# Patient Record
Sex: FEMALE | Race: WHITE | NOT HISPANIC OR LATINO | URBAN - METROPOLITAN AREA
[De-identification: names, ages, dates, MRNs, and addresses within clinical notes are randomized per-mention and may not be internally consistent; named-entity substitution may affect disease eponyms.]

---

## 2022-08-30 ENCOUNTER — OUTPATIENT (OUTPATIENT)
Dept: OUTPATIENT SERVICES | Facility: HOSPITAL | Age: 47
LOS: 1 days | End: 2022-08-30
Payer: COMMERCIAL

## 2022-08-30 VITALS
HEART RATE: 74 BPM | SYSTOLIC BLOOD PRESSURE: 134 MMHG | OXYGEN SATURATION: 100 % | WEIGHT: 134.92 LBS | TEMPERATURE: 97 F | DIASTOLIC BLOOD PRESSURE: 48 MMHG | RESPIRATION RATE: 16 BRPM

## 2022-08-30 DIAGNOSIS — Z98.890 OTHER SPECIFIED POSTPROCEDURAL STATES: Chronic | ICD-10-CM

## 2022-08-30 DIAGNOSIS — J98.8 OTHER SPECIFIED RESPIRATORY DISORDERS: ICD-10-CM

## 2022-08-30 DIAGNOSIS — J34.89 OTHER SPECIFIED DISORDERS OF NOSE AND NASAL SINUSES: ICD-10-CM

## 2022-08-30 DIAGNOSIS — J34.2 DEVIATED NASAL SEPTUM: ICD-10-CM

## 2022-08-30 DIAGNOSIS — Z01.818 ENCOUNTER FOR OTHER PREPROCEDURAL EXAMINATION: ICD-10-CM

## 2022-08-30 DIAGNOSIS — J34.3 HYPERTROPHY OF NASAL TURBINATES: ICD-10-CM

## 2022-08-30 LAB
APTT BLD: 35.2 SEC — SIGNIFICANT CHANGE UP (ref 27.5–35.5)
HCG SERPL-ACNC: <1 MIU/ML — SIGNIFICANT CHANGE UP
HCT VFR BLD CALC: 39.8 % — SIGNIFICANT CHANGE UP (ref 34.5–45)
HGB BLD-MCNC: 12.9 G/DL — SIGNIFICANT CHANGE UP (ref 11.5–15.5)
INR BLD: 1.18 RATIO — HIGH (ref 0.88–1.16)
MCHC RBC-ENTMCNC: 28 PG — SIGNIFICANT CHANGE UP (ref 27–34)
MCHC RBC-ENTMCNC: 32.4 GM/DL — SIGNIFICANT CHANGE UP (ref 32–36)
MCV RBC AUTO: 86.3 FL — SIGNIFICANT CHANGE UP (ref 80–100)
NRBC # BLD: 0 /100 WBCS — SIGNIFICANT CHANGE UP (ref 0–0)
PLATELET # BLD AUTO: 344 K/UL — SIGNIFICANT CHANGE UP (ref 150–400)
PROTHROM AB SERPL-ACNC: 13.8 SEC — HIGH (ref 10.5–13.4)
RBC # BLD: 4.61 M/UL — SIGNIFICANT CHANGE UP (ref 3.8–5.2)
RBC # FLD: 12.5 % — SIGNIFICANT CHANGE UP (ref 10.3–14.5)
WBC # BLD: 6.18 K/UL — SIGNIFICANT CHANGE UP (ref 3.8–10.5)
WBC # FLD AUTO: 6.18 K/UL — SIGNIFICANT CHANGE UP (ref 3.8–10.5)

## 2022-08-30 PROCEDURE — 84702 CHORIONIC GONADOTROPIN TEST: CPT

## 2022-08-30 PROCEDURE — 85730 THROMBOPLASTIN TIME PARTIAL: CPT

## 2022-08-30 PROCEDURE — 85610 PROTHROMBIN TIME: CPT

## 2022-08-30 PROCEDURE — 36415 COLL VENOUS BLD VENIPUNCTURE: CPT

## 2022-08-30 PROCEDURE — 85027 COMPLETE CBC AUTOMATED: CPT

## 2022-08-30 PROCEDURE — G0463: CPT

## 2022-08-30 RX ORDER — DEXTROAMPHETAMINE SACCHARATE, AMPHETAMINE ASPARTATE, DEXTROAMPHETAMINE SULFATE AND AMPHETAMINE SULFATE 1.875; 1.875; 1.875; 1.875 MG/1; MG/1; MG/1; MG/1
0 TABLET ORAL
Qty: 0 | Refills: 0 | DISCHARGE

## 2022-08-30 RX ORDER — VENLAFAXINE HCL 75 MG
0 CAPSULE, EXT RELEASE 24 HR ORAL
Qty: 0 | Refills: 0 | DISCHARGE

## 2022-08-30 NOTE — H&P PST ADULT - FALL HARM RISK - UNIVERSAL INTERVENTIONS
Bed in lowest position, wheels locked, appropriate side rails in place/Call bell, personal items and telephone in reach/Instruct patient to call for assistance before getting out of bed or chair/Non-slip footwear when patient is out of bed/[place] to call system/Physically safe environment - no spills, clutter or unnecessary equipment/Purposeful Proactive Rounding/Room/bathroom lighting operational, light cord in reach

## 2022-08-30 NOTE — H&P PST ADULT - NSICDXPASTMEDICALHX_GEN_ALL_CORE_FT
PAST MEDICAL HISTORY:  No pertinent past medical history      PAST MEDICAL HISTORY:  Anxiety     Attention deficit hyperactivity disorder (ADHD)     Deviated nasal septum

## 2022-08-30 NOTE — H&P PST ADULT - HISTORY OF PRESENT ILLNESS
48 y/o female with PMH of anxiety and ADHD here for PST. Pt complaining of having chronic sinus congestion for many years and diagnosed with deviated septum. Pt states right side is worse than left side nasal congestion. Pt complaining of last sinus infection in 1/2022 and denies sinus headache and sinus pain. Pt electing for septoplasty, bilateral turbinoplasty, repair of nasal vestibular stenosis on 9/6/2022.

## 2022-08-30 NOTE — H&P PST ADULT - NSANTHOSAYNRD_GEN_A_CORE
No. KENJI screening performed.  STOP BANG Legend: 0-2 = LOW Risk; 3-4 = INTERMEDIATE Risk; 5-8 = HIGH Risk

## 2022-08-30 NOTE — H&P PST ADULT - PROBLEM SELECTOR PLAN 2
No medical clearance needed as per surgeon. CBC, PT/PTT and HCG ordered. Pre-op instructions given and pt verbalized understanding. COVID19 PCR testing to be done within 72 hours of surgery at [facility].

## 2022-09-01 PROBLEM — F41.9 ANXIETY DISORDER, UNSPECIFIED: Chronic | Status: ACTIVE | Noted: 2022-08-30

## 2022-09-01 PROBLEM — J34.2 DEVIATED NASAL SEPTUM: Chronic | Status: ACTIVE | Noted: 2022-08-30

## 2022-09-01 PROBLEM — F90.9 ATTENTION-DEFICIT HYPERACTIVITY DISORDER, UNSPECIFIED TYPE: Chronic | Status: ACTIVE | Noted: 2022-08-30

## 2022-09-02 NOTE — ASU PATIENT PROFILE, ADULT - FALL HARM RISK - UNIVERSAL INTERVENTIONS
Bed in lowest position, wheels locked, appropriate side rails in place/Call bell, personal items and telephone in reach/Instruct patient to call for assistance before getting out of bed or chair/Non-slip footwear when patient is out of bed/[place] to call system/Physically safe environment - no spills, clutter or unnecessary equipment/Purposeful Proactive Rounding/Room/bathroom lighting operational, light cord in reach

## 2022-09-02 NOTE — ASU PATIENT PROFILE, ADULT - NSICDXPASTMEDICALHX_GEN_ALL_CORE_FT
PAST MEDICAL HISTORY:  Anxiety     Attention deficit hyperactivity disorder (ADHD)     Deviated nasal septum

## 2022-09-04 ENCOUNTER — OUTPATIENT (OUTPATIENT)
Dept: OUTPATIENT SERVICES | Facility: HOSPITAL | Age: 47
LOS: 1 days | End: 2022-09-04
Payer: COMMERCIAL

## 2022-09-04 DIAGNOSIS — Z98.890 OTHER SPECIFIED POSTPROCEDURAL STATES: Chronic | ICD-10-CM

## 2022-09-04 DIAGNOSIS — Z20.828 CONTACT WITH AND (SUSPECTED) EXPOSURE TO OTHER VIRAL COMMUNICABLE DISEASES: ICD-10-CM

## 2022-09-04 LAB — SARS-COV-2 RNA SPEC QL NAA+PROBE: SIGNIFICANT CHANGE UP

## 2022-09-04 PROCEDURE — U0003: CPT

## 2022-09-04 PROCEDURE — U0005: CPT

## 2022-09-05 ENCOUNTER — TRANSCRIPTION ENCOUNTER (OUTPATIENT)
Age: 47
End: 2022-09-05

## 2022-09-06 ENCOUNTER — OUTPATIENT (OUTPATIENT)
Dept: OUTPATIENT SERVICES | Facility: HOSPITAL | Age: 47
LOS: 1 days | End: 2022-09-06
Payer: COMMERCIAL

## 2022-09-06 ENCOUNTER — TRANSCRIPTION ENCOUNTER (OUTPATIENT)
Age: 47
End: 2022-09-06

## 2022-09-06 VITALS
RESPIRATION RATE: 14 BRPM | SYSTOLIC BLOOD PRESSURE: 134 MMHG | WEIGHT: 134.92 LBS | TEMPERATURE: 99 F | HEART RATE: 74 BPM | DIASTOLIC BLOOD PRESSURE: 62 MMHG | OXYGEN SATURATION: 100 %

## 2022-09-06 VITALS
SYSTOLIC BLOOD PRESSURE: 115 MMHG | HEART RATE: 89 BPM | RESPIRATION RATE: 17 BRPM | OXYGEN SATURATION: 97 % | DIASTOLIC BLOOD PRESSURE: 65 MMHG

## 2022-09-06 DIAGNOSIS — Z98.890 OTHER SPECIFIED POSTPROCEDURAL STATES: Chronic | ICD-10-CM

## 2022-09-06 DIAGNOSIS — J34.2 DEVIATED NASAL SEPTUM: ICD-10-CM

## 2022-09-06 DIAGNOSIS — J98.8 OTHER SPECIFIED RESPIRATORY DISORDERS: ICD-10-CM

## 2022-09-06 DIAGNOSIS — J34.3 HYPERTROPHY OF NASAL TURBINATES: ICD-10-CM

## 2022-09-06 DIAGNOSIS — J34.89 OTHER SPECIFIED DISORDERS OF NOSE AND NASAL SINUSES: ICD-10-CM

## 2022-09-06 PROCEDURE — 88300 SURGICAL PATH GROSS: CPT

## 2022-09-06 PROCEDURE — 30930 THER FX NASAL INF TURBINATE: CPT

## 2022-09-06 PROCEDURE — 88300 SURGICAL PATH GROSS: CPT | Mod: 26

## 2022-09-06 PROCEDURE — C1889: CPT

## 2022-09-06 PROCEDURE — 30420 RECONSTRUCTION OF NOSE: CPT

## 2022-09-06 DEVICE — SHEET SILICONE 5X5CMX.5MM: Type: IMPLANTABLE DEVICE | Status: FUNCTIONAL

## 2022-09-06 DEVICE — PACKING NASAL MEROGEL 4X4CM: Type: IMPLANTABLE DEVICE | Status: FUNCTIONAL

## 2022-09-06 DEVICE — SURGICEL 2 X 14": Type: IMPLANTABLE DEVICE | Status: FUNCTIONAL

## 2022-09-06 RX ORDER — HYDROMORPHONE HYDROCHLORIDE 2 MG/ML
0.5 INJECTION INTRAMUSCULAR; INTRAVENOUS; SUBCUTANEOUS
Refills: 0 | Status: DISCONTINUED | OUTPATIENT
Start: 2022-09-06 | End: 2022-09-06

## 2022-09-06 RX ORDER — CEFAZOLIN SODIUM 1 G
2000 VIAL (EA) INJECTION ONCE
Refills: 0 | Status: COMPLETED | OUTPATIENT
Start: 2022-09-06 | End: 2022-09-06

## 2022-09-06 RX ORDER — OXYCODONE HYDROCHLORIDE 5 MG/1
5 TABLET ORAL ONCE
Refills: 0 | Status: DISCONTINUED | OUTPATIENT
Start: 2022-09-06 | End: 2022-09-06

## 2022-09-06 RX ORDER — LACTOBACILLUS ACIDOPHILUS 100MM CELL
0 CAPSULE ORAL
Qty: 0 | Refills: 0 | DISCHARGE

## 2022-09-06 RX ORDER — VENLAFAXINE HCL 75 MG
0 CAPSULE, EXT RELEASE 24 HR ORAL
Qty: 0 | Refills: 0 | DISCHARGE

## 2022-09-06 RX ORDER — DEXTROAMPHETAMINE SACCHARATE, AMPHETAMINE ASPARTATE, DEXTROAMPHETAMINE SULFATE AND AMPHETAMINE SULFATE 1.875; 1.875; 1.875; 1.875 MG/1; MG/1; MG/1; MG/1
0 TABLET ORAL
Qty: 0 | Refills: 0 | DISCHARGE

## 2022-09-06 RX ORDER — MULTIVIT WITH MIN/MFOLATE/K2 340-15/3 G
400 POWDER (GRAM) ORAL
Qty: 0 | Refills: 0 | DISCHARGE

## 2022-09-06 RX ORDER — ONDANSETRON 8 MG/1
4 TABLET, FILM COATED ORAL ONCE
Refills: 0 | Status: COMPLETED | OUTPATIENT
Start: 2022-09-06 | End: 2022-09-06

## 2022-09-06 RX ORDER — SODIUM CHLORIDE 9 MG/ML
1000 INJECTION, SOLUTION INTRAVENOUS
Refills: 0 | Status: DISCONTINUED | OUTPATIENT
Start: 2022-09-06 | End: 2022-09-06

## 2022-09-06 RX ADMIN — ONDANSETRON 4 MILLIGRAM(S): 8 TABLET, FILM COATED ORAL at 14:50

## 2022-09-06 RX ADMIN — SODIUM CHLORIDE 75 MILLILITER(S): 9 INJECTION, SOLUTION INTRAVENOUS at 07:01

## 2022-09-06 RX ADMIN — HYDROMORPHONE HYDROCHLORIDE 0.5 MILLIGRAM(S): 2 INJECTION INTRAMUSCULAR; INTRAVENOUS; SUBCUTANEOUS at 14:08

## 2022-09-06 RX ADMIN — HYDROMORPHONE HYDROCHLORIDE 0.5 MILLIGRAM(S): 2 INJECTION INTRAMUSCULAR; INTRAVENOUS; SUBCUTANEOUS at 14:38

## 2022-09-06 NOTE — ASU DISCHARGE PLAN (ADULT/PEDIATRIC) - CARE PROVIDER_API CALL
Chuckie Jackson)  Facial Plastic and Reconstructive Surgery; Otolaryngology  [address] [place] [address]  Phone: [phone]  Fax: [phone]  Follow Up Time:

## 2022-09-06 NOTE — BRIEF OPERATIVE NOTE - NSICDXBRIEFPOSTOP_GEN_ALL_CORE_FT
POST-OP DIAGNOSIS:  Deviated septum 06-Sep-2022 13:50:37  Chuckie Jackson  Nasal turbinate hypertrophy 06-Sep-2022 13:50:43  Chuckie Jackson  Nasal valve stenosis 06-Sep-2022 13:50:52  Chuckie Jackson

## 2022-09-06 NOTE — BRIEF OPERATIVE NOTE - NSICDXBRIEFPREOP_GEN_ALL_CORE_FT
PRE-OP DIAGNOSIS:  Deviated septum 06-Sep-2022 13:50:14  Chuckie Jackson  Nasal turbinate hypertrophy 06-Sep-2022 13:50:18  Chuckie Jackson  Nasal valve stenosis 06-Sep-2022 13:50:28  Chuckie Jackson

## 2022-09-06 NOTE — BRIEF OPERATIVE NOTE - NSICDXBRIEFPROCEDURE_GEN_ALL_CORE_FT
PROCEDURES:  Septoplasty 06-Sep-2022 13:49:17  Chuckie Jackson  Turbinoplasty with submucous resection 06-Sep-2022 13:49:39  Chuckie Jackson  Repair, nasal valve 06-Sep-2022 13:50:03  Chuckie Jackson

## 2022-09-06 NOTE — ASU DISCHARGE PLAN (ADULT/PEDIATRIC) - NS MD DC FALL RISK RISK
For information on Fall & Injury Prevention, visit: [email or web address] OR  [email or web address] OR  https://www.cdc.gov/steadi/patient.html

## 2022-09-06 NOTE — H&P PST ADULT - SURGICAL SITE INCISION
----- Message from Stephanie Hudson CMA sent at 9/1/2022  3:42 PM CDT -----    ----- Message -----  From: Cesar Davison MD  Sent: 8/31/2022  11:56 PM CDT  To: , #    As expected, you average sugar is just as bad as it was 9 months ago with an average of 255, should be below 150. Take the insulin, pioglitazone and metformin as prescribed and watch your diet much better.  Continue atorvastatin, bad cholesterol went up a little from 96 to 106, goal is under 100.  Your kidney function is still normal.     Left pt a voice message to contact the office back.   no no

## 2022-09-06 NOTE — ASU DISCHARGE PLAN (ADULT/PEDIATRIC) - NS DC INTERPRETER YES NO
Plan of care discussed with pt. VSS. Denies c/o CP or SOB. Monitor showing SR. Pt remains free of injury or falls. Encouraged use of incentive spirometer. Educated on importance of diabetic diet and controlling Blood sugars. All questions addressed. Uneventful night shift.    No

## 2023-03-16 PROBLEM — Z00.00 ENCOUNTER FOR PREVENTIVE HEALTH EXAMINATION: Status: ACTIVE | Noted: 2023-03-16

## 2023-03-17 ENCOUNTER — APPOINTMENT (OUTPATIENT)
Dept: PSYCHIATRY | Facility: CLINIC | Age: 48
End: 2023-03-17
Payer: COMMERCIAL

## 2023-03-17 DIAGNOSIS — Z87.891 PERSONAL HISTORY OF NICOTINE DEPENDENCE: ICD-10-CM

## 2023-03-17 DIAGNOSIS — F41.9 ANXIETY DISORDER, UNSPECIFIED: ICD-10-CM

## 2023-03-17 PROCEDURE — 99205 OFFICE O/P NEW HI 60 MIN: CPT

## 2023-03-17 NOTE — FAMILY HISTORY
[FreeTextEntry1] : Brother: ADHD, substance dontae\par Maternal grandfather: substance\par Sister: ADD

## 2023-03-17 NOTE — HISTORY OF PRESENT ILLNESS
[FreeTextEntry1] : Patient is here in the office for face to face interview for initial psychiatric evaluation \par \par ID: Pt is a 47 year-old  female,  with 3 kids , on leave of absence, living in a house in [place] with her  and 2 sons seen today for psychiatric evaluation and medication management. \par \par HPI: Patient states her previous psychiatrist Dr. Greene stopped taking her insurance and needed to look for a new psychiatrist. Patient has been suffering from anxiety since 4 yoa due to separation anxiety (did not want to go to school or leave her parents, Had h/o social anxiety). States as she got older at 11 anxiety got better but she saw she had problems retaining information and felt she had "ADHD". She was always worried if she has everything she needs on her like her lunch, or her books or assignments. States the anxiety then increased more when she was 22 which is when she sought out for mental health care by talking to a therapist and seeing a psychiatrist.  Stressors contributing to her anxiety at the time her kids who are 16 and 18. 15 yo had PANDAS and older one had anxiety and depression). Patient and her  were having problems in their marriage so they went to see a marriage counselor. 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 \par Currently on Effexor 37.5 mg x 7 years\par \par Depression: denies any low mood or anhedonia. \par Anxiety: endorses to anxiety in the form of worrying, rumination, somatization (fatigue and tension in the back of the neck). All better with Effexor. \par Sleep: 6-8 hours per night. \par Denies any problems with appetite. Energy, concentration, and motivation are all improved on the Effexor. Denies any AVH, SI or HI. \par \par Hypomanic symptoms: denies\par Substance use hx: \par Nicotine: 1 pack lasts her 2 days. Picked it up March 2022. Before 1 pack would last her 1 week. \par Alcohol started at age 16 drinking socially. After birth of first son at 31 she was drinking 1 bottle per night. Last drink was Saturday at a party where she had 4-5 glasses of wine. +Blackout. Denies any DWI or withdrawal effects from alcohol. \par Marijuana: experimented \par Cocaine: experimented. Once per year\par Ecstasy: experimented\par Caffeine: 1-2 cups per day\par \par  [FreeTextEntry2] : H/O: depression and anxiety\par Inpatient hospitalization: denies \par Past SI: cutting at 16\par Therapist: denies\par Psychiatrist: Dr. Owen Greene x 2 years. Did not take his insurance\par Medication trials: Paxil (helped), Zoloft ("It was OK."), Wellbutrin (made me feel tired; was only on Wellbutrin and had no anxiety coverage).\par Current medications: Effexor 37.5 mg Cap x 7 years\par Firearms: 10+ guns.  goes to [employer]. Locked away. \par \par

## 2023-03-17 NOTE — SOCIAL HISTORY
[FreeTextEntry1] : Born: [place], [place]\par Siblings: 1 sister (48) 1 brother (56)\par Parents:   when patient was 19. Lot of arguments at home. 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 \par /Kids: 20 years with 2 sons (16 and 18) and 1 daughter (29) from a previous relationship\par Abuse: denies\par Legal:  denies

## 2023-03-17 NOTE — PLAN
[FreeTextEntry4] : Assessment: Patient is a 46 yo female with h/o anxiety seen today for medication management. Patient is compliant with the medications, tolerating it well without any side effects. I-STOP was checked without any problems.\par \par PLAN:\par Continue Effexor 37.5 g PO QAM for anxiety\par Start Wellbutrin 75 mg PO QAM for depression, low motivation, energy, concentration and decrease SSRI induced sexual dysfunction.\par - Discussed risks and benefits of medications including side effects of GI and sexual with SSRI. Alternative strategies including no intervention discussed with patient. Patient consents to current medications as prescribed.\par - Patient understands to contact clinic prn with concerns and agrees to call 911 or go to nearest ER if symptoms worsen.\par - Next appointment made in 1 month. Patient left the office without any distress.\par \par \par I spent a total of 60 minutes for today's visit in evaluating and treating the patient as per above, including: preparing for patient's appointment (review of prior documents, [facility] ), performing a medically necessary exam/evaluation, communicating/counseling/educating the patient ordering medications\par  \par

## 2023-04-14 ENCOUNTER — APPOINTMENT (OUTPATIENT)
Dept: PSYCHIATRY | Facility: CLINIC | Age: 48
End: 2023-04-14
Payer: COMMERCIAL

## 2023-04-14 PROCEDURE — 99214 OFFICE O/P EST MOD 30 MIN: CPT

## 2023-04-14 RX ORDER — BUPROPION HYDROCHLORIDE 75 MG/1
75 TABLET, FILM COATED ORAL
Qty: 30 | Refills: 0 | Status: COMPLETED | COMMUNITY
Start: 2023-03-17 | End: 2023-04-14

## 2023-04-14 RX ORDER — VENLAFAXINE HYDROCHLORIDE 37.5 MG/1
37.5 CAPSULE, EXTENDED RELEASE ORAL DAILY
Qty: 90 | Refills: 0 | Status: ACTIVE | COMMUNITY
Start: 2023-03-17 | End: 1900-01-01

## 2023-04-14 NOTE — SOCIAL HISTORY
[FreeTextEntry1] : Born: [place], [place]\par Siblings: 1 sister (48) 1 brother (56)\par Parents:   when patient was 19. Lot of arguments at home. 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 \par /Kids: 20 years with 2 sons (16 and 18) and 1 daughter (29) from a previous relationship\par Abuse: denies\par Legal:  denies

## 2023-04-14 NOTE — PLAN
[FreeTextEntry4] : Assessment: Patient is a 48 yo female with h/o anxiety seen today for medication management. Patient is compliant with the medications, tolerating it well without any side effects. I-STOP was checked without any problems.\par \par PLAN:\par Continue Effexor 37.5 g PO QAM for anxiety\par D/C Wellbutrin 75 mg PO QAM for depression, low motivation, energy, concentration and decrease SSRI induced sexual dysfunction.\par - Discussed risks and benefits of medications including side effects of GI and sexual with SSRI. Alternative strategies including no intervention discussed with patient. Patient consents to current medications as prescribed.\par - Patient understands to contact clinic prn with concerns and agrees to call 911 or go to nearest ER if symptoms worsen.\par - Next appointment made in 3 month. Patient left the office without any distress.\par \par \par \par

## 2023-04-14 NOTE — HISTORY OF PRESENT ILLNESS
[FreeTextEntry1] : Patient is here in the office for face to face interview for initial psychiatric evaluation \par \par ID: Pt is a 47 year-old  female,  with 3 kids , on leave of absence, living in a house in [place] with her  and 2 sons seen today for psychiatric evaluation and medication management. \par \par HPI: Patient states her previous psychiatrist Dr. Greene stopped taking her insurance and needed to look for a new psychiatrist. Patient has been suffering from anxiety since 4 yoa due to separation anxiety (did not want to go to school or leave her parents, Had h/o social anxiety). States as she got older at 11 anxiety got better but she saw she had problems retaining information and felt she had "ADHD". She was always worried if she has everything she needs on her like her lunch, or her books or assignments. States the anxiety then increased more when she was 22 which is when she sought out for mental health care by talking to a therapist and seeing a psychiatrist.  Stressors contributing to her anxiety at the time her kids who are 16 and 18. 17 yo had PANDAS and older one had anxiety and depression). Patient and her  were having problems in their marriage so they went to see a marriage counselor. 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 \par Currently on Effexor 37.5 mg x 7 years\par \par Depression: denies any low mood or anhedonia. \par Anxiety: endorses to anxiety in the form of worrying, rumination, somatization (fatigue and tension in the back of the neck). All better with Effexor. \par Sleep: 6-8 hours per night. \par Denies any problems with appetite. Energy, concentration, and motivation are all improved on the Effexor. Denies any AVH, SI or HI. \par \par Hypomanic symptoms: denies\par Substance use hx: \par Nicotine: 1 pack lasts her 2 days. Picked it up March 2022. Before 1 pack would last her 1 week. \par Alcohol started at age 16 drinking socially. After birth of first son at 31 she was drinking 1 bottle per night. Last drink was Saturday at a party where she had 4-5 glasses of wine. +Blackout. Denies any DWI or withdrawal effects from alcohol. \par Marijuana: experimented \par Cocaine: experimented. Once per year\par Ecstasy: experimented\par Caffeine: 1-2 cups per day\par \par  [FreeTextEntry2] : H/O: depression and anxiety\par Inpatient hospitalization: denies \par Past SI: cutting at 16\par Therapist: denies\par Psychiatrist: Dr. Owen Greene x 2 years. Did not take his insurance\par Medication trials: Paxil (helped), Zoloft ("It was OK."), Wellbutrin (made me feel tired; was only on Wellbutrin and had no anxiety coverage).\par Current medications: Effexor 37.5 mg Cap x 7 years\par Firearms: 10+ guns.  goes to [employer]. Locked away. \par \par

## 2023-10-23 NOTE — ASU PREOP CHECKLIST - NSBLOODTRANS_GEN_A_CORE_SIUH
no... Mastoid Interpolation Flap Text: A decision was made to reconstruct the defect utilizing an interpolation axial flap and a staged reconstruction.  A telfa template was made of the defect.  This telfa template was then used to outline the mastoid interpolation flap.  The donor area for the pedicle flap was then injected with anesthesia.  The flap was excised through the skin and subcutaneous tissue down to the layer of the underlying musculature.  The pedicle flap was carefully excised within this deep plane to maintain its blood supply.  The edges of the donor site were undermined.   The donor site was closed in a primary fashion.  The pedicle was then rotated into position and sutured.  Once the tube was sutured into place, adequate blood supply was confirmed with blanching and refill.  The pedicle was then wrapped with xeroform gauze and dressed appropriately with a telfa and gauze bandage to ensure continued blood supply and protect the attached pedicle.

## (undated) DEVICE — SOL ANTI FOG

## (undated) DEVICE — SYR LUER LOK 10CC

## (undated) DEVICE — PACK NASAL

## (undated) DEVICE — BLADE SCALPEL SAFETYLOCK #15

## (undated) DEVICE — DRSG KLING 2"

## (undated) DEVICE — DRSG STERISTRIPS 0.5 X 4"

## (undated) DEVICE — SUT PLAIN GUT FAST ABSORBING 5-0 PC-1

## (undated) DEVICE — SYR LUER LOK 20CC

## (undated) DEVICE — BLADE SCALPEL SAFETYLOCK #11

## (undated) DEVICE — VENODYNE/SCD SLEEVE CALF LARGE

## (undated) DEVICE — ELCTR BOVIE PENCIL HANDPIECE

## (undated) DEVICE — NDL HYPO SAFE 18G X 1.5" (PINK)

## (undated) DEVICE — SUT MONOSOF 6-0 18" P-10 UNDYED

## (undated) DEVICE — SOL IRR POUR H2O 1000ML

## (undated) DEVICE — WARMING BLANKET LOWER ADULT

## (undated) DEVICE — VENODYNE/SCD SLEEVE CALF MEDIUM

## (undated) DEVICE — SPECIMEN CONTAINER 4OZ

## (undated) DEVICE — ELCTR BOVIE TIP BLADE INSULATED 2.75" EDGE

## (undated) DEVICE — SUT POLYSORB 5-0 18" P-13 UNDYED

## (undated) DEVICE — GLV 7.5 PROTEXIS (WHITE)

## (undated) DEVICE — DRSG MASTISOL

## (undated) DEVICE — DRAPE TOWEL BLUE 17" X 24"

## (undated) DEVICE — SUT CHROMIC 4-0 18" P-3

## (undated) DEVICE — NDL HYPO REGULAR BEVEL 27G X 1.25" (GRAY)

## (undated) DEVICE — SOL IRR POUR NS 0.9% 1000ML

## (undated) DEVICE — PLV-SCD MACHINE: Type: DURABLE MEDICAL EQUIPMENT

## (undated) DEVICE — PROTECTOR HEEL / ELBOW FLUFFY

## (undated) DEVICE — DRAPE INSTRUMENT POUCH 6.75" X 11"

## (undated) DEVICE — SUT MONOSOF 3-0 18" C-14

## (undated) DEVICE — DRSG AQUAPLAST TIE DOWN 6X9" IVORY 8 SHEETS

## (undated) DEVICE — APPLICATOR Q TIP 6" WOOD STEM

## (undated) DEVICE — SUCTION YANKAUER TAPERED BULBOUS NO VENT

## (undated) DEVICE — SUT POLYSORB 4-0 18" P-13 UNDYED

## (undated) DEVICE — Device